# Patient Record
Sex: MALE | Race: WHITE | NOT HISPANIC OR LATINO | Employment: FULL TIME | URBAN - METROPOLITAN AREA
[De-identification: names, ages, dates, MRNs, and addresses within clinical notes are randomized per-mention and may not be internally consistent; named-entity substitution may affect disease eponyms.]

---

## 2024-08-07 ENCOUNTER — APPOINTMENT (OUTPATIENT)
Dept: RADIOLOGY | Facility: HOSPITAL | Age: 34
End: 2024-08-07
Payer: COMMERCIAL

## 2024-08-07 ENCOUNTER — HOSPITAL ENCOUNTER (EMERGENCY)
Facility: HOSPITAL | Age: 34
Discharge: HOME | End: 2024-08-07
Payer: COMMERCIAL

## 2024-08-07 VITALS
BODY MASS INDEX: 26.95 KG/M2 | SYSTOLIC BLOOD PRESSURE: 124 MMHG | RESPIRATION RATE: 18 BRPM | WEIGHT: 210 LBS | DIASTOLIC BLOOD PRESSURE: 84 MMHG | OXYGEN SATURATION: 97 % | TEMPERATURE: 96.8 F | HEART RATE: 78 BPM | HEIGHT: 74 IN
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
## 2024-08-07 PROCEDURE — 90471 IMMUNIZATION ADMIN: CPT

## 2024-08-07 PROCEDURE — 90675 RABIES VACCINE IM: CPT | Mod: JZ

## 2024-08-07 PROCEDURE — 90375 RABIES IG IM/SC: CPT | Mod: JZ

## 2024-08-07 PROCEDURE — 90715 TDAP VACCINE 7 YRS/> IM: CPT

## 2024-08-07 PROCEDURE — 2500000001 HC RX 250 WO HCPCS SELF ADMINISTERED DRUGS (ALT 637 FOR MEDICARE OP)

## 2024-08-07 PROCEDURE — 99283 EMERGENCY DEPT VISIT LOW MDM: CPT | Mod: 25

## 2024-08-07 PROCEDURE — 2500000004 HC RX 250 GENERAL PHARMACY W/ HCPCS (ALT 636 FOR OP/ED): Mod: JZ

## 2024-08-07 PROCEDURE — 73090 X-RAY EXAM OF FOREARM: CPT | Mod: RIGHT SIDE | Performed by: RADIOLOGY

## 2024-08-07 PROCEDURE — 73090 X-RAY EXAM OF FOREARM: CPT | Mod: RT

## 2024-08-07 PROCEDURE — 90472 IMMUNIZATION ADMIN EACH ADD: CPT

## 2024-08-07 RX ORDER — AMOXICILLIN AND CLAVULANATE POTASSIUM 875; 125 MG/1; MG/1
1 TABLET, FILM COATED ORAL EVERY 12 HOURS
Qty: 10 TABLET | Refills: 0 | Status: SHIPPED | OUTPATIENT
Start: 2024-08-07 | End: 2024-08-12

## 2024-08-07 RX ORDER — AMOXICILLIN AND CLAVULANATE POTASSIUM 875; 125 MG/1; MG/1
1 TABLET, FILM COATED ORAL ONCE
Status: COMPLETED | OUTPATIENT
Start: 2024-08-07 | End: 2024-08-07

## 2024-08-07 ASSESSMENT — PAIN DESCRIPTION - DESCRIPTORS: DESCRIPTORS: SHARP

## 2024-08-07 ASSESSMENT — COLUMBIA-SUICIDE SEVERITY RATING SCALE - C-SSRS
2. HAVE YOU ACTUALLY HAD ANY THOUGHTS OF KILLING YOURSELF?: NO
6. HAVE YOU EVER DONE ANYTHING, STARTED TO DO ANYTHING, OR PREPARED TO DO ANYTHING TO END YOUR LIFE?: NO
1. IN THE PAST MONTH, HAVE YOU WISHED YOU WERE DEAD OR WISHED YOU COULD GO TO SLEEP AND NOT WAKE UP?: NO

## 2024-08-07 ASSESSMENT — PAIN - FUNCTIONAL ASSESSMENT
PAIN_FUNCTIONAL_ASSESSMENT: 0-10
PAIN_FUNCTIONAL_ASSESSMENT: 0-10

## 2024-08-07 ASSESSMENT — PAIN SCALES - GENERAL
PAINLEVEL_OUTOF10: 4
PAINLEVEL_OUTOF10: 3

## 2024-08-07 ASSESSMENT — PAIN DESCRIPTION - LOCATION: LOCATION: ARM

## 2024-08-07 ASSESSMENT — PAIN DESCRIPTION - ORIENTATION: ORIENTATION: RIGHT

## 2024-08-07 NOTE — DISCHARGE INSTRUCTIONS
You are seen in the emergency department today after you sustained a dog bite.  You have received the rabies vaccine as well as tetanus and antibiotics for this dog bite.  We have sent antibiotics to local pharmacy please pick them up and take them as indicated.  Please return back for your rabies shot as scheduled.  If you develop any fever nausea vomiting headache stiff neck or any other concerning symptoms please return back to the emergency department soon as possible.

## 2024-08-07 NOTE — ED PROVIDER NOTES
EMERGENCY DEPARTMENT ENCOUNTER      Pt Name: Fadi Guevara  MRN: [id number]  Birthdate 1990  Date of evaluation: 8/7/2024  Provider: To Yun MD    CHIEF COMPLAINT       Chief Complaint   Patient presents with    Animal Bite     Pt was bit by a medium sized dog approx 16 hour prior to arrival. Dog owner obtained the animal from the shelter with unknown vaccination status. Pt presents with 1 puncture wound over the Right forearm and scratches on the opposing side.      HISTORY OF PRESENT ILLNESS    HPI  34-year-old male presents emergency department chief complaint of a dog bite.  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  The patient is an .  He entered the home and the dog bit him on the right arm.  The patient claims that the dog was acting aggressively but claims that the owners indicate that the dog has had all of his shots otherwise and had been acting normally. He does not know when his last tetanus shot was and reports he has never been vaccinated for rabies..  Review of systems otherwise negative.  The patient is concerned that the dog may have rabies or that his arm was broken.    Nursing Notes were reviewed.    PAST MEDICAL HISTORY   No past medical history on file.      SURGICAL HISTORY     No past surgical history on file.      CURRENT MEDICATIONS       Previous Medications    No medications on file       ALLERGIES     Patient has no known allergies.    FAMILY HISTORY     No family history on file.       SOCIAL HISTORY       Social History     Socioeconomic History    Marital status: Single     Social Determinants of Health     Financial Resource Strain: Low Risk  (4/7/2024)    Received from [facility]    Overall Financial Resource Strain (CARDIA)     Difficulty of Paying Living Expenses: Not hard at all   Food Insecurity: No Food Insecurity (4/7/2024)    Received from [facility]    Hunger Vital Sign     Worried About Running Out of Food in the Last Year: Never true      Ran Out of Food in the Last Year: Never true   Transportation Needs: No Transportation Needs (4/7/2024)    Received from [facility]    PRAPARE - Transportation     Lack of Transportation (Medical): No     Lack of Transportation (Non-Medical): No   Physical Activity: Sufficiently Active (4/7/2024)    Received from [facility]    Exercise Vital Sign     Days of Exercise per Week: 5 days     Minutes of Exercise per Session: 90 min   Stress: Stress Concern Present (4/7/2024)    Received from [facility]    [employer] - Occupational Stress Questionnaire     Feeling of Stress : Rather much   Social Connections: Socially Isolated (4/7/2024)    Received from [facility]    Social Connection and Isolation Panel [NHANES]     Frequency of Communication with Friends and Family: Once a week     Frequency of Social Gatherings with Friends and Family: Once a week     Attends Orthodox Services: Never     Active Member of Clubs or Organizations: No     Attends Club or Organization Meetings: Never     Marital Status:        SCREENINGS                        PHYSICAL EXAM    (up to 7 for level 4, 8 or more for level 5)     ED Triage Vitals [08/07/24 0326]   Temperature Heart Rate Respirations BP   36 °C (96.8 °F) 80 16 122/83      Pulse Ox Temp src Heart Rate Source Patient Position   98 % -- -- --      BP Location FiO2 (%)     Right arm --       Physical Exam  Vitals and nursing note reviewed.   Constitutional:       General: He is not in acute distress.     Appearance: He is well-developed.   HENT:      Head: Normocephalic and atraumatic.   Eyes:      Conjunctiva/sclera: Conjunctivae normal.   Cardiovascular:      Rate and Rhythm: Normal rate and regular rhythm.      Heart sounds: No murmur heard.  Pulmonary:      Effort: Pulmonary effort is normal. No respiratory distress.      Breath sounds: Normal breath sounds.   Abdominal:      Palpations: Abdomen is soft.       Tenderness: There is no abdominal tenderness.   Musculoskeletal:         General: No swelling.        Arms:       Cervical back: Neck supple.      Comments: Small single punctate wound to the right dorsal forearm.  No foreign body or active bleeding.    Right hand Exam: Patient exhibits ability to flex and extend all digits. Including flexion at the proximal and distal interphalangeal joints against resistance.  Median nerve was tested with thumb abduction against resistance and opposition which were normal.  Sensory testing was performed of the median nerve using light touch on the radial and ulnar aspects of digits 1 through 3.  Radial nerve testing was performed with thumb extension against resistance which was normal.  Sensory testing of the radial nerve was normal via light touch to the central and radial aspect of the dorsum of the hand and dorsal radial aspect of the thumb.  Ulnar nerve was tested via abduction of fingers against resistance as well as light touch to the fourth and fifth digits.  Cap refill was less than 2 seconds in all 5 digits.  Alignment check was performed which revealed no malrotation.  No sign of acute compartment syndrome, flexor tenosynovitis, high-pressure injection injury.  No flexor tendon injury.     Skin:     General: Skin is warm and dry.      Capillary Refill: Capillary refill takes less than 2 seconds.   Neurological:      Mental Status: He is alert.   Psychiatric:         Mood and Affect: Mood normal.          DIAGNOSTIC RESULTS     LABS:  Labs Reviewed - No data to display    All other labs were within normal range or not returned as of this dictation.    Imaging  XR forearm right 2 views   Final Result   Mild soft tissue swelling of the mid forearm. No radiopaque foreign   body or soft tissue gas.        No acute osseous abnormality.             MACRO:   None        Signed by: Eloise Nichols 8/7/2024 4:14 AM   Dictation workstation:   [id number]            Procedures  Procedures     EMERGENCY DEPARTMENT COURSE/MDM:   Medical Decision Making  34-year-old male presents emergency department with chief complaint of animal bite.  Medical management treatment emergency department will consist of rabies vaccine and immunoglobulin at the site of the wound as well as tetanus shot as well as Augmentin with a 5-day course.  The patient has received the vaccine and immunoglobulin.  He will be provided a schedule for rabies vaccination series x 4 doses.  We have also sent Augmentin to his local pharmacy.  The patient will be discharged home with strict return precautions.  Wound was copiously irrigated at bedside and cleaned.  Patient advised on wound care instructions and to monitor for any signs of infection. Wound left open to heal by secondary retention.     ED Course as of 08/07/24 0448   Wed Aug 07, 2024   0423 Patient elected to undergo rabies vaccination series.  Initial intramuscular dose as well as IVIG to be given.  Will be started on Augmentin prophylaxis for 5 days twice daily.  Tetanus updated.  Advised to monitor the dog for any signs of change in behavior.  He was given vaccination card and advised in the 4 dose series and needs to be completed.  He is welcome to come back to the emergency department or follow-up with the health department.  Form filled out for dog bite reporting from the emergency department. [TL]   0439 XR forearm right 2 views [TL]      ED Course User Index  [TL] Orlin Bee DO         Diagnoses as of 08/07/24 0448   Dog bite, initial encounter        Patient and or family in agreement and understanding of treatment plan.  All questions answered.      I reviewed the case with the attending ED physician. The attending ED physician agrees with the plan. Patient and/or patient´s representative was counseled regarding labs, imaging, likely diagnosis, and plan. All questions were answered.    ED Medications administered this visit:     Medications   rabies vaccine (from purified chicken embryo cells) (RabAvert) 2.5 unit immunization 1 mL (has no administration in time range)   rabies immune globulin (PF) (HypeRAB) injection 1,920 Units (has no administration in time range)   amoxicillin-pot clavulanate (Augmentin) 875-125 mg per tablet 1 tablet (has no administration in time range)   diphth,pertus(acell),tetanus (BoostRIX) 2.5-8-5 Lf-mcg-Lf/0.5mL vaccine 0.5 mL (has no administration in time range)       New Prescriptions from this visit:    New Prescriptions    No medications on file       Follow-up:  No follow-up provider specified.      Final Impression: No diagnosis found.      (Please note that portions of this note were completed with a voice recognition program.  Efforts were made to edit the dictations but occasionally words are mis-transcribed.)     To Yun MD  Resident  08/07/24 3013      I performed a history and physical examination of Fadi Guevara and discussed his management with Dr. Yun.  I agree with the history, physical, assessment, and plan of care, with the following exceptions: None    I was present for the following procedures: None  Time Spent in Critical Care of the patient: None  Time spent in discussions with the patient and family: 30    Orlin Bee, DO Orlin Bee DO  08/07/24 7885

## 2024-08-07 NOTE — Clinical Note
Fadi Guevara was seen and treated in our emergency department on 8/7/2024.  He may return to work on 08/08/2024.       If you have any questions or concerns, please don't hesitate to call.      To Yun MD

## 2024-08-10 ENCOUNTER — HOSPITAL ENCOUNTER (EMERGENCY)
Age: 34
Discharge: HOME | End: 2024-08-10
Attending: EMERGENCY MEDICINE
Payer: COMMERCIAL

## 2024-08-10 VITALS
HEIGHT: 74 IN | BODY MASS INDEX: 26.95 KG/M2 | HEART RATE: 83 BPM | DIASTOLIC BLOOD PRESSURE: 90 MMHG | WEIGHT: 210 LBS | SYSTOLIC BLOOD PRESSURE: 129 MMHG | RESPIRATION RATE: 18 BRPM | OXYGEN SATURATION: 97 % | TEMPERATURE: 98.4 F
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
## 2024-08-10 PROCEDURE — 90675 RABIES VACCINE IM: CPT | Performed by: EMERGENCY MEDICINE

## 2024-08-10 PROCEDURE — 99284 EMERGENCY DEPT VISIT MOD MDM: CPT

## 2024-08-10 PROCEDURE — 90471 IMMUNIZATION ADMIN: CPT | Performed by: EMERGENCY MEDICINE

## 2024-08-10 PROCEDURE — 6360000002 HC RX W HCPCS: Performed by: EMERGENCY MEDICINE

## 2024-08-10 RX ADMIN — RABIES VACCINE 1 ML: KIT at 22:12

## 2024-08-10 ASSESSMENT — PAIN - FUNCTIONAL ASSESSMENT: PAIN_FUNCTIONAL_ASSESSMENT: NONE - DENIES PAIN
